# Patient Record
Sex: FEMALE | URBAN - METROPOLITAN AREA
[De-identification: names, ages, dates, MRNs, and addresses within clinical notes are randomized per-mention and may not be internally consistent; named-entity substitution may affect disease eponyms.]

---

## 2023-05-09 ENCOUNTER — LAB OUTSIDE AN ENCOUNTER (OUTPATIENT)
Dept: URBAN - METROPOLITAN AREA CLINIC 17 | Facility: CLINIC | Age: 43
End: 2023-05-09

## 2023-05-09 ENCOUNTER — DASHBOARD ENCOUNTERS (OUTPATIENT)
Age: 43
End: 2023-05-09

## 2023-05-09 ENCOUNTER — OFFICE VISIT (OUTPATIENT)
Dept: URBAN - METROPOLITAN AREA CLINIC 17 | Facility: CLINIC | Age: 43
End: 2023-05-09
Payer: SELF-PAY

## 2023-05-09 VITALS
HEIGHT: 64 IN | TEMPERATURE: 97 F | DIASTOLIC BLOOD PRESSURE: 82 MMHG | BODY MASS INDEX: 26.46 KG/M2 | SYSTOLIC BLOOD PRESSURE: 126 MMHG | HEART RATE: 100 BPM | WEIGHT: 155 LBS

## 2023-05-09 DIAGNOSIS — R93.5 ABNORMAL CT OF THE ABDOMEN: ICD-10-CM

## 2023-05-09 DIAGNOSIS — R93.2 ABNORMAL LIVER CT: ICD-10-CM

## 2023-05-09 DIAGNOSIS — R10.30 LOWER ABDOMINAL PAIN: ICD-10-CM

## 2023-05-09 PROCEDURE — 99204 OFFICE O/P NEW MOD 45 MIN: CPT | Performed by: INTERNAL MEDICINE

## 2023-05-09 NOTE — HPI-TODAY'S VISIT:
This is a 43 yo female who has been experiencing abdominal pain for the past year.  Her symptoms have become preogressively worse.  The pain is rated a 7 and lasts 30 minutes to one hour, occurs anytime, not associated wiht meals, located in the pelvic region and radiates to the back.  A pelvic US done by her PCP was unremarkable. She denies diarrhea and constipation. A CT scan demonstrated multiple lesions in the liver all of which were 1 cm or below and thickening of the distal esophagus as well as a hiatal hernia.  CMP and CBC are WNL.